# Patient Record
(demographics unavailable — no encounter records)

---

## 2025-01-14 NOTE — PHYSICAL EXAM
[Normocephalic] : normocephalic [Atraumatic] : atraumatic [EOMI] : extra ocular movement intact [PERRL] : pupils equal, round and reactive to light [Sclera nonicteric] : sclera nonicteric [Supple] : supple [No Supraclavicular Adenopathy] : no supraclavicular adenopathy [Examined in the supine and seated position] : examined in the supine and seated position [Symmetrical] : symmetrical [Bra Size: ___] : Bra Size: [unfilled] [Grade 2] : Ptosis Grade 2 [No dominant masses] : no dominant masses in right breast  [No dominant masses] : no dominant masses left breast [No Nipple Retraction] : no left nipple retraction [No Nipple Discharge] : no left nipple discharge [Breast Mass Right Breast ___cm] : no masses [Breast Mass Left Breast ___cm] : no masses [Breast Nipple Inversion] : nipples not inverted [Breast Nipple Retraction] : nipples not retracted [Breast Nipple Flattening] : nipples not flattened [Breast Nipple Fissures] : nipples not fissured [Breast Abnormal Lactation (Galactorrhea)] : no galactorrhea [Breast Abnormal Secretion Bloody Fluid] : no bloody discharge [Breast Abnormal Secretion Serous Fluid] : no serous discharge [Breast Abnormal Secretion Opalescent Fluid] : no milky discharge [No Axillary Lymphadenopathy] : no left axillary lymphadenopathy [No Edema] : no edema [No Rashes] : no rashes [No Ulceration] : no ulceration [de-identified] : non-labored respirations

## 2025-01-14 NOTE — HISTORY OF PRESENT ILLNESS
[FreeTextEntry1] : This is a 56 year-old F referred by Dr. Chris Coelho for evaluation of R intraductal papilloma, here for initial consultation.  She is accompanied by her  Momo Calhoun.  Prior history: The patient reports that she has had routine mammography since age 40s.  In 2018, the patient reports that she was recalled for a right breast finding that ended up being a cyst. She has not had any prior biopsies.  Patient denies any breast complaints today. Denies any breast masses, skin changes, or nipple discharge.   Recent imagin2024 B/L SM (NW) TC 13.8% revealed almost entirely fatty breasts -R/L neg -BR0  2024 B/L US (NW) -R RA 3 mm hypoechoic intraductal nodule vs. clumped debris--> R targeted US -L neg -BR0  2024 R US (NW) -R 9:00 RA duct containing 3 mm hypoechoic nodule--> USG-CNB -BR4a  12/10/2024 R USG-CNB (NW) -R 9:00 RA 3 mm nodule (ribbon): intraductal papilloma, *benign & concordant--> surgical consultation    PMH: HTN, HLD, pre-DM PSH: hysterectomy  for fibroids Meds: atorvastatin, losartan ALL: denies SH: No tobacco. No EtOH FH: Mother breast cancer age 70s. Mat aunt breast cancer age 70s. VUS RAD51c Renaldo . GYN: Menarche 12.  Menopause 47.  .  Age of first full-term pregnancy 21.  Breast-feeding 8 months.  OCP 2 to 3 years.  Fertility none.  HRT none.

## 2025-01-14 NOTE — ASSESSMENT
[FreeTextEntry1] : This is a 56 year-old F referred by Dr. Chris Coelho for evaluation of R intraductal papilloma, here for initial consultation.  I discussed with the patient that papillary breast lesions were previously always recommended to allow histologic evaluation of the entire lesion. Prior studies estimated a 5 to 8% incidence of upgrading of these lesions, most commonly to either atypical hyperplasia or DCIS. However, more recent studies looking at pure IDP without atypia have shown lower rates of upgrade of 1.7%. Therefore, the decision to excise should be based on size, symptoms, or breast cancer risk factors.   Preoperative, intraoperative, and postoperative considerations including preoperative localization by Radiology of this nonpalpable mass, intraoperative anesthetic management, and what she can expect in postoperative period were reviewed.    Risks, benefits, and alternatives to proposed surgical procedure were reviewed and all questions were answered to her satisfaction.  Given that this is very small and the patient is asymptomatic, we will proceed with active surveillance.  PLAN:  - R  6/2025  - RTO 6 months

## 2025-01-14 NOTE — PHYSICAL EXAM
[Normocephalic] : normocephalic [Atraumatic] : atraumatic [EOMI] : extra ocular movement intact [PERRL] : pupils equal, round and reactive to light [Sclera nonicteric] : sclera nonicteric [Supple] : supple [No Supraclavicular Adenopathy] : no supraclavicular adenopathy [Examined in the supine and seated position] : examined in the supine and seated position [Symmetrical] : symmetrical [Bra Size: ___] : Bra Size: [unfilled] [Grade 2] : Ptosis Grade 2 [No dominant masses] : no dominant masses in right breast  [No dominant masses] : no dominant masses left breast [No Nipple Retraction] : no left nipple retraction [No Nipple Discharge] : no left nipple discharge [Breast Mass Right Breast ___cm] : no masses [Breast Mass Left Breast ___cm] : no masses [Breast Nipple Inversion] : nipples not inverted [Breast Nipple Retraction] : nipples not retracted [Breast Nipple Flattening] : nipples not flattened [Breast Nipple Fissures] : nipples not fissured [Breast Abnormal Lactation (Galactorrhea)] : no galactorrhea [Breast Abnormal Secretion Bloody Fluid] : no bloody discharge [Breast Abnormal Secretion Serous Fluid] : no serous discharge [Breast Abnormal Secretion Opalescent Fluid] : no milky discharge [No Axillary Lymphadenopathy] : no left axillary lymphadenopathy [No Edema] : no edema [No Rashes] : no rashes [No Ulceration] : no ulceration [de-identified] : non-labored respirations

## 2025-01-14 NOTE — CONSULT LETTER
[Dear  ___] : Dear  [unfilled], [Consult Letter:] : I had the pleasure of evaluating your patient, [unfilled]. [Please see my note below.] : Please see my note below. [Consult Closing:] : Thank you very much for allowing me to participate in the care of this patient.  If you have any questions, please do not hesitate to contact me. [Sincerely,] : Sincerely, [FreeTextEntry2] : Chris Coelho MD [FreeTextEntry3] : Brenda Pérez MD Breast Surgeon Division of Surgical Oncology Department of Surgery [address] [place] [address] Tel: [phone] Fax: [phone] Email: [email or web address] [DrGiulia  ___] : Dr. DEL VALLE

## 2025-01-14 NOTE — PAST MEDICAL HISTORY
[Surgical Menopause] : The patient is in surgical menopause [Menarche Age ____] : age at menarche was [unfilled] [Menopause Age____] : age at menopause was [unfilled] [History of Hormone Replacement Treatment] : has no history of hormone replacement treatment [Total Preg ___] : G[unfilled] [Live Births ___] : P[unfilled]  [Age At Live Birth ___] : Age at live birth: [unfilled] [FreeTextEntry6] : none [FreeTextEntry7] : 2-3 years [FreeTextEntry8] : 8 months

## 2025-01-14 NOTE — CONSULT LETTER
[Dear  ___] : Dear  [unfilled], [Consult Letter:] : I had the pleasure of evaluating your patient, [unfilled]. [Please see my note below.] : Please see my note below. [Consult Closing:] : Thank you very much for allowing me to participate in the care of this patient.  If you have any questions, please do not hesitate to contact me. [Sincerely,] : Sincerely, [FreeTextEntry2] : Chris Coelho MD [FreeTextEntry3] : Brenda Pérez MD Breast Surgeon Division of Surgical Oncology Department of Surgery [address] [place] [address] Tel: [phone] Fax: [phone] Email: [email or web address] [DrGiulia  ___] : Dr. DEL VALLE

## 2025-07-15 NOTE — END OF VISIT
[Time Spent: ___ minutes] : I have spent [unfilled] minutes of time on the encounter which excludes teaching and separately reported services.
[Time Spent: ___ minutes] : I have spent [unfilled] minutes of time on the encounter which excludes teaching and separately reported services.
Pt states she went out last night with some friends and about 3AM states she blacked out. States she doesn't drink a lot but was drinking. Reports she left with a radha who dropped her home, reports nobody could find her for a couple hours. States her friends states she called them around 5am but she doesn't remember that. States she wants to make sure nothing happened as she was not coherent.

## 2025-07-16 NOTE — PAST MEDICAL HISTORY
[Surgical Menopause] : The patient is in surgical menopause [Menarche Age ____] : age at menarche was [unfilled] [Menopause Age____] : age at menopause was [unfilled] [Total Preg ___] : G[unfilled] [Live Births ___] : P[unfilled]  [Age At Live Birth ___] : Age at live birth: [unfilled] [History of Hormone Replacement Treatment] : has no history of hormone replacement treatment [FreeTextEntry6] : none [FreeTextEntry7] : 2-3 years [FreeTextEntry8] : 8 months

## 2025-07-16 NOTE — ASSESSMENT
[FreeTextEntry1] : This is a 56 year-old F referred by Dr. Chris Coelho for evaluation of R intraductal papilloma, here for follow up.  I discussed with the patient that papillary breast lesions were previously always recommended to allow histologic evaluation of the entire lesion. Prior studies estimated a 5 to 8% incidence of upgrading of these lesions, most commonly to either atypical hyperplasia or DCIS. However, more recent studies looking at pure IDP without atypia have shown lower rates of upgrade of 1.7%. Therefore, the decision to excise should be based on size, symptoms, or breast cancer risk factors.   Recent imaging reviewed, stable, BR3  Exam today no masses or lymphadenopathy  PLAN:  - B/L DM/US 11/2025  - RTO 6 months or PRN

## 2025-07-16 NOTE — CONSULT LETTER
[Dear  ___] : Dear  [unfilled], [Consult Letter:] : I had the pleasure of evaluating your patient, [unfilled]. [Please see my note below.] : Please see my note below. [Consult Closing:] : Thank you very much for allowing me to participate in the care of this patient.  If you have any questions, please do not hesitate to contact me. [Sincerely,] : Sincerely, [DrGiulia  ___] : Dr. DEL VALLE [Courtesy Letter:] : I had the pleasure of seeing your patient, [unfilled], in my office today. [FreeTextEntry2] : Chris Coelho MD [FreeTextEntry3] : Brenda Pérez MD Breast Surgeon Division of Surgical Oncology Department of Surgery [address] [place] [address] Tel: [phone] Fax: [phone] Email: [email or web address]

## 2025-07-16 NOTE — CONSULT LETTER
[Dear  ___] : Dear  [unfilled], [Consult Letter:] : I had the pleasure of evaluating your patient, [unfilled]. [Please see my note below.] : Please see my note below. [Consult Closing:] : Thank you very much for allowing me to participate in the care of this patient.  If you have any questions, please do not hesitate to contact me. [Sincerely,] : Sincerely, [DrGiulia  ___] : Dr. DEL VALLE [Courtesy Letter:] : I had the pleasure of seeing your patient, [unfilled], in my office today. [FreeTextEntry2] : Chris Coelho MD [FreeTextEntry3] : Brenda Pérez MD Breast Surgeon Division of Surgical Oncology Department of Surgery [address] [place] [address] Tel: [phone] Fax: [phone] Email: [email or web address]

## 2025-07-16 NOTE — HISTORY OF PRESENT ILLNESS
[FreeTextEntry1] : This is a 56 year-old F referred by Dr. Chris Coelho for evaluation of R intraductal papilloma, here for follow up.  She is not accompanied by her  Momo Calhoun.  Prior history: The patient reports that she has had routine mammography since age 40s.  In 2018, the patient reports that she was recalled for a right breast finding that ended up being a cyst. She has not had any prior biopsies.  Patient denies any breast complaints today. Denies any breast masses, skin changes, or nipple discharge.   Recent imagin2024 B/L SM (NW) TC 13.8% revealed almost entirely fatty breasts -R/L neg -BR0  2024 B/L US (NW) -R RA 3 mm hypoechoic intraductal nodule vs. clumped debris--> R targeted US -L neg -BR0  2024 R US (NW) -R 9:00 RA duct containing 3 mm hypoechoic nodule--> USG-CNB -BR4a  12/10/2024 R USG-CNB (NW) -R 9:00 RA 3 mm nodule (ribbon): intraductal papilloma, *benign & concordant--> surgical consultation   PMH: HTN, HLD, pre-DM PSH: hysterectomy  for fibroids Meds: atorvastatin, losartan ALL: denies SH: No tobacco. No EtOH FH: Mother breast cancer age 70s. Mat aunt breast cancer age 70s. VUS RAD51c Renaldo . GYN: Menarche 12.  Menopause 47.  .  Age of first full-term pregnancy 21.  Breast-feeding 8 months.  OCP 2 to 3 years.  Fertility none.  HRT none  2025 R US (NW) -R subareolar 2 mm intraductal mass within a dilated duct similar to 2024--> B/L DM/US 2025 on schedule -BR3  Interval history:  Patient denies any breast complaints today. Denies any breast masses, skin changes, or nipple discharge. Notes some tenderness to the right breast.

## 2025-07-16 NOTE — PHYSICAL EXAM
[Normocephalic] : normocephalic [Atraumatic] : atraumatic [EOMI] : extra ocular movement intact [PERRL] : pupils equal, round and reactive to light [Sclera nonicteric] : sclera nonicteric [Supple] : supple [No Supraclavicular Adenopathy] : no supraclavicular adenopathy [Examined in the supine and seated position] : examined in the supine and seated position [Symmetrical] : symmetrical [Bra Size: ___] : Bra Size: [unfilled] [Grade 2] : Ptosis Grade 2 [No dominant masses] : no dominant masses in right breast  [No dominant masses] : no dominant masses left breast [No Nipple Retraction] : no left nipple retraction [No Nipple Discharge] : no left nipple discharge [Breast Mass Right Breast ___cm] : no masses [Breast Mass Left Breast ___cm] : no masses [Breast Nipple Inversion] : nipples not inverted [Breast Nipple Retraction] : nipples not retracted [Breast Nipple Flattening] : nipples not flattened [Breast Nipple Fissures] : nipples not fissured [Breast Abnormal Lactation (Galactorrhea)] : no galactorrhea [Breast Abnormal Secretion Bloody Fluid] : no bloody discharge [Breast Abnormal Secretion Serous Fluid] : no serous discharge [Breast Abnormal Secretion Opalescent Fluid] : no milky discharge [No Axillary Lymphadenopathy] : no left axillary lymphadenopathy [No Edema] : no edema [No Rashes] : no rashes [No Ulceration] : no ulceration [de-identified] : non-labored respirations

## 2025-07-16 NOTE — PHYSICAL EXAM
[Normocephalic] : normocephalic [Atraumatic] : atraumatic [EOMI] : extra ocular movement intact [PERRL] : pupils equal, round and reactive to light [Sclera nonicteric] : sclera nonicteric [Supple] : supple [No Supraclavicular Adenopathy] : no supraclavicular adenopathy [Examined in the supine and seated position] : examined in the supine and seated position [Symmetrical] : symmetrical [Bra Size: ___] : Bra Size: [unfilled] [Grade 2] : Ptosis Grade 2 [No dominant masses] : no dominant masses in right breast  [No dominant masses] : no dominant masses left breast [No Nipple Retraction] : no left nipple retraction [No Nipple Discharge] : no left nipple discharge [Breast Mass Right Breast ___cm] : no masses [Breast Mass Left Breast ___cm] : no masses [Breast Nipple Inversion] : nipples not inverted [Breast Nipple Retraction] : nipples not retracted [Breast Nipple Flattening] : nipples not flattened [Breast Nipple Fissures] : nipples not fissured [Breast Abnormal Lactation (Galactorrhea)] : no galactorrhea [Breast Abnormal Secretion Bloody Fluid] : no bloody discharge [Breast Abnormal Secretion Serous Fluid] : no serous discharge [Breast Abnormal Secretion Opalescent Fluid] : no milky discharge [No Axillary Lymphadenopathy] : no left axillary lymphadenopathy [No Edema] : no edema [No Rashes] : no rashes [No Ulceration] : no ulceration [de-identified] : non-labored respirations